# Patient Record
Sex: MALE
[De-identification: names, ages, dates, MRNs, and addresses within clinical notes are randomized per-mention and may not be internally consistent; named-entity substitution may affect disease eponyms.]

---

## 2019-07-29 ENCOUNTER — HOSPITAL ENCOUNTER (OUTPATIENT)
Dept: HOSPITAL 5 - CT | Age: 79
Discharge: HOME | End: 2019-07-29
Attending: INTERNAL MEDICINE
Payer: MEDICARE

## 2019-07-29 DIAGNOSIS — L92.8: Primary | ICD-10-CM

## 2019-07-29 PROCEDURE — 71250 CT THORAX DX C-: CPT

## 2019-07-29 NOTE — CAT SCAN REPORT
CT CHEST WITHOUT CONTRAST



INDICATION / CLINICAL INFORMATION:  COUGH NOT IMPROVING ON ANTIBIOTIC(ABNORMAL CXR) R93.89. Chronic c
ough.



TECHNIQUE:

Axial CT images were obtained through the chest without contrast. Sagittal and coronal reformatted im
ages. All CT scans at this location are performed using CT dose reduction for ALARA by means of autom
ated exposure control. 



COMPARISON: None available.



FINDINGS:



HEART: The heart is normal size and contour. Mild coronary artery calcifications are noted. Normal pe
ricardium. 

THORACIC AORTA: Normal caliber. Mild scattered calcific plaques are noted throughout the thoracic aor
ta. No aneurysm. 

MEDIASTINUM and LINDA: No significant abnormality. Subcentimeter calcified left hilar lymph nodes are 
noted.

LUNGS: No acute air space or interstitial disease.  3 mm calcified granuloma is noted in the left low
er lobe.

PLEURA: No significant pleural effusion. No pneumothorax. A 2.6 x 0.9 x 1.6 cm lipoma is noted adjace
nt to the left fifth rib laterally.

SKELETAL SYSTEM: Moderate discogenic disease is noted throughout the thoracic spine. No fracture or s
uspicious bony lesion.



UPPER ABDOMEN: No acute abnormality. Surgical changes are noted along the lesser curvature of the sto
mach.



ADDITIONAL FINDINGS: None.



IMPRESSION:

 No acute cardiorespiratory process is identified. The lungs are clear. No evidence for parenchymal d
isease or infiltrate. Incidental left chest wall lipoma.

Chronic granulomatous disease findings.



Signer Name: Andrea Olson Jr, MD 

Signed: 7/29/2019 12:46 PM

 Workstation Name: [id number]